# Patient Record
(demographics unavailable — no encounter records)

---

## 2024-11-19 NOTE — REVIEW OF SYSTEMS
[Feeling Tired] : feeling tired [Recent Weight Loss (___ Lbs)] : recent [unfilled] ~Ulb weight loss [Dry Eyes] : dryness of the eyes [As Noted in HPI] : as noted in HPI [Joint Pain] : joint pain [Negative] : Heme/Lymph

## 2024-11-19 NOTE — REASON FOR VISIT
Patient called, stating Sunday night started having runny nose and dry throat. Stating that over the counter medications are ineffective.    Patient denies fever, pain, bleeding, shortness of breath.    Patient offered next available appointment. Patient accepted.  Patient advised to go to ER if symptoms become life threatening.       [Follow-Up: _____] : a [unfilled] follow-up visit

## 2024-11-20 NOTE — ADDENDUM
[FreeTextEntry1] :  I, Lopez Vides, acted solely as a scribe for Dr. Myron I. Kleiner, MD. on 11/19/2024. I personally performed the services described in the documentation, reviewed the documentation recorded by the scribe in my presence, and it accurately and completely records my words and actions.
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
## 2024-11-20 NOTE — ASSESSMENT
[FreeTextEntry1] : Impression: HERNAN LEMUS is a 68 year old man who presents for follow up office visit for further evaluation of joint symptoms and rheumatic diseases including osteoarthritis, chronic gout, Raynaud's, Sjogren's syndrome, chronic low back pain/lumbar herniated disc/neuroforaminal stenosis LS spine, and chronic neck pain.  Patient feels fairly well. Some pain right SI joint Secondary to osteoarthritis and chronic low back pain/lumbar herniated disc/neuroforaminal stenosis LS spine. Denies other recent joint pain otherwise pt osteoarthritis is under good control. From previous physical exam tender bilateral lumbar paraspinal muscles - resolved. No recent episodes of acute gouty arthritis with pt chronic gout quiescent. Some recent dry eyes and dry mouth - on exam pt has dry eyes and tongue slightly moist Secondary to Sjogren Syndrome. Using artificial tears, biotene mouthwash, and oral hydration with relief. No recent Raynauds Episodes - on exam pt has Raynauds episode - not bothersome. Recent lab tests results revealed serum uric acid 3.8, +Anti-Ro antibody, otherwise unrevealing-- with extensive discussion. Bone densitometry results revealed no significant changes or results -- with extensive discussion. The patient continues vitamin D supplements. Patient is content with current medication regimen. Patient denies rash or side effects with current medications.   Plan: I reviewed chart and previous records I reviewed recent lab results with patient with extensive discussion I reviewed recent Bone densitometry results with patient including my analysis of raw data with extensive discussion Laboratory tests ordered - see list below - with coordination of care  Diagnosis and prognosis discussed Continue current medications (other than those changed below) Consider alternative NSAIDs- pt declined wants to continue Ibuprofen  Increase to ibuprofen 800 mg q.i.d with food for 10 days then decrease back to 800 mg t.i.d. at end of meals thereafter (Possible side effects explained including cardiovascular risk/MI/CVA) Artificial tears one drop each eye q.i.d. and p.r.n.(Possible side effects explained) Biotene mouthwash/spray q.i.d. and p.r.n.(Possible side effects explained)  Oral Hydration Patient declines oral medication for dryness  Daily exercise starting at 10 minutes per day, gradually increasing to at least 30 minutes per day - emphasized Keep hands and feet and torso warm - patient warned of dangers of gangrene/digital amputation with Raynaud's--extensive discussion Increase home thermostat to at least 72 to 74 F--extensive discussion 15-20 year history of nontender subcutaneous mass 2 cm diameter LUQ abdominal wall, tender during the past 6 weeks--general surgery consult--[facility] - reemphasized  Return visit 3 months All questions and concerns were addressed Total time for this office visit, including face-to-face time and non-face-to-face time, 72 minutes--- including review of the chart and previous records, detailed review of his medical history, review of previous lab results with extensive discussion with the patient, ordering lab tests with coordination of care, review ofPrevious imaging reports/x-ray results, review of his bone densitometry results including my analysis of the raw data with extensive discussion, detailed medication history, review of medications going forward with their possible side effects, reviewed protocol for prevention of Raynaud's with extensive discussion as noted above, reviewed the modalities for treatment of his dryness with extensive discussion, urged him again to seek surgery consultation regarding the left upper quadrant abdominal subcutaneous mass

## 2024-11-20 NOTE — CONSULT LETTER
[Dear  ___] : Dear  [unfilled], [Consult Letter:] : I had the pleasure of evaluating your patient, [unfilled]. [Please see my note below.] : Please see my note below. [Consult Closing:] : Thank you very much for allowing me to participate in the care of this patient.  If you have any questions, please do not hesitate to contact me. [Sincerely,] : Sincerely, [DrJovana  ___] : Dr. WEBB [DrJovana ___] : Dr. WEBB [FreeTextEntry3] : Kevin\par  Myron I. Kleiner, M.D., FACR \par  Chief, Division of Rheumatology\par  Department of Medicine \par  [facility]

## 2024-11-20 NOTE — CONSULT LETTER
[Dear  ___] : Dear  [unfilled], [Consult Letter:] : I had the pleasure of evaluating your patient, [unfilled]. [Please see my note below.] : Please see my note below. [Consult Closing:] : Thank you very much for allowing me to participate in the care of this patient.  If you have any questions, please do not hesitate to contact me. [Sincerely,] : Sincerely, [DrJovana  ___] : Dr. WEBB [DrJovana ___] : Dr. WEBB [FreeTextEntry3] : Kevin\par  Myron I. Kleiner, M.D., FACR \par  Chief, Division of Rheumatology\par  Department of Medicine \par  [facility]
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
